# Patient Record
Sex: MALE | Race: WHITE
[De-identification: names, ages, dates, MRNs, and addresses within clinical notes are randomized per-mention and may not be internally consistent; named-entity substitution may affect disease eponyms.]

---

## 2019-07-24 ENCOUNTER — HOSPITAL ENCOUNTER (OUTPATIENT)
Dept: HOSPITAL 11 - JP.SDS | Age: 74
Discharge: HOME | End: 2019-07-24
Attending: SURGERY
Payer: MEDICARE

## 2019-07-24 VITALS — SYSTOLIC BLOOD PRESSURE: 129 MMHG | HEART RATE: 45 BPM | DIASTOLIC BLOOD PRESSURE: 88 MMHG

## 2019-07-24 DIAGNOSIS — E78.5: ICD-10-CM

## 2019-07-24 DIAGNOSIS — I12.9: ICD-10-CM

## 2019-07-24 DIAGNOSIS — Z12.11: Primary | ICD-10-CM

## 2019-07-24 DIAGNOSIS — N18.3: ICD-10-CM

## 2019-07-24 DIAGNOSIS — C61: ICD-10-CM

## 2019-07-24 DIAGNOSIS — Z86.010: ICD-10-CM

## 2019-07-24 NOTE — OR
DATE OF PROCEDURE:  07/24/2019

 

PREOPERATIVE DIAGNOSIS:  History of colon polyps.

 

POSTOPERATIVE DIAGNOSIS:  History of colon polyps.  Unremarkable colonoscopy.

 

PROCEDURE:  Colonoscopy to the cecum.



SURGEON:  Jose Olivas MD. 

 

ANESTHESIA:  IV anesthesia with monitored anesthesia care.

 

INDICATION:  This 74-year-old white male is referred for a colonoscopy.  He has 
a history of

colon polyps.  His last colonoscopic exam, he says, was done 5 years ago.  He 
also has

metastatic prostate cancer.  I counseled him for the procedure including risks 
and alternatives, 

and he gave his informed consent to proceed.

 

DESCRIPTION OF PROCEDURE:  The patient was placed in the left lateral decubitus 
position.

IV anesthesia was administered by the Anesthesia Service.  Time-out was held.  
A rectal exam

was performed, which was unrevealing.  The flexible video Olympus colonoscope 
was introduced

through his anus, up his rectum, and out his colon all the way to the cecum.  
Once the cecum

was reached, the scope was slowly withdrawn examining the mucosa throughout.  
No mucosal

abnormalities were noted.  The scope was retroflexed in the rectum with the 
distal rectum

appearing unremarkable.  The scope was straightened and removed.  He tolerated 
the procedure

well.

 

 

 

 

Jose Olivas MD

DD:  07/24/2019 10:46:19

DT:  07/24/2019 11:16:14

Job #:  [id number]

MTDD

## 2020-12-06 NOTE — EDM.PDOC
ED HPI GENERAL MEDICAL PROBLEM





- General


Chief Complaint: Respiratory Problem


Stated Complaint: POSS PNEUMONIA


Time Seen by Provider: 12/06/20 12:05


Source of Information: Reports: Patient, Old Records, RN


History Limitations: Reports: No Limitations





- History of Present Illness


INITIAL COMMENTS - FREE TEXT/NARRATIVE: 





74 yo male presents with coughing, mainly in the mornings for about a month. No 

fever. Has not been to his primary Dr. Estrada at any time over this interval. 

Decided today that perhaps he had pneumonia and came to the ER. Has some post 

nasal drip. No hx of asthma since childhood. Is not a smoker. Is currently on 

Amox for a dental infection. 


Onset: Gradual


Duration: Week(s): (4), Waxing/Waning


Location: Reports: Head, Chest


Quality: Reports: Other (no reported pain)


Severity: Mild


Improves with: Reports: None


Worsens with: Reports: Other (in mornings, ? cause)


Context: Reports: Other (See HPI)


Associated Symptoms: Reports: Cough


Treatments PTA: Reports: Other (see below) (none)





- Related Data


                                    Allergies











Allergy/AdvReac Type Severity Reaction Status Date / Time


 


No Known Allergies Allergy   Verified 12/06/20 11:50











Home Meds: 


                                    Home Meds





Lisinopril 30 mg PO DAILY 05/11/16 [History]


Amoxicillin 1 tab PO DAILY 12/06/20 [History]


Fenofibrate 1 tab PO DAILY 12/06/20 [History]











Past Medical History


HEENT History: Reports: Cataract, Hard of Hearing, Impaired Vision


Cardiovascular History: Reports: High Cholesterol, Hypertension


Genitourinary History: Reports: BPH, Other (See Below)


Other Genitourinary History: Prostate cancer/hormone injections


Hematologic History: Reports: Anemia


Oncologic (Cancer) History: Reports: Prostate





- Infectious Disease History


Infectious Disease History: Reports: Chicken Pox, Measles, Mumps, Shingles





- Past Surgical History


HEENT Surgical History: Reports: Cataract Surgery, Oral Surgery


GI Surgical History: Reports: Colonoscopy, EGD


Male  Surgical History: Reports: Prostate Biopsy





Social & Family History





- Tobacco Use


Tobacco Use Status *Q: Never Tobacco User





- Caffeine Use


Caffeine Use: Reports: Coffee





ED ROS GENERAL





- Review of Systems


Review Of Systems: See Below


Constitutional: Reports: No Symptoms


HEENT: Reports: Other (cerumen in both ears)


Respiratory: Reports: Cough.  Denies: Shortness of Breath, Wheezing


Cardiovascular: Reports: No Symptoms


GI/Abdominal: Reports: No Symptoms


Skin: Reports: No Symptoms





ED EXAM, GENERAL





- Physical Exam


Exam: See Below


Exam Limited By: Uncooperative


General Appearance: Alert, WD/WN, No Apparent Distress


Eye Exam: Bilateral Eye: Normal Inspection


Ears: Normal External Exam, Normal Canal, Hearing Loss, Other (cerumen in both 

ears)


Ear Exam: Bilateral Ear: Auricle Normal, Canal Normal


Nose: Normal Inspection, No Blood


Throat/Mouth: Normal Inspection, Normal Lips, Normal Oropharynx, Normal Voice, 

No Airway Compromise


Head: Atraumatic, Normocephalic


Neck: Normal Inspection


Respiratory/Chest: No Respiratory Distress, Lungs Clear, Normal Breath Sounds, 

No Accessory Muscle Use


Cardiovascular: Regular Rate, Rhythm, No Edema


Extremities: Normal Inspection


Neurological: Alert, Oriented, CN II-XII Intact, Normal Cognition, No 

Motor/Sensory Deficits


Psychiatric: Normal Affect, Normal Mood


Skin Exam: Warm, Dry, Intact, Normal Color, No Rash





Course





- Vital Signs


Last Recorded V/S: 





                                Last Vital Signs











Temp  36.1 C   12/06/20 12:02


 


Pulse  75   12/06/20 12:02


 


Resp  18   12/06/20 12:02


 


BP  124/35 L  12/06/20 12:02


 


Pulse Ox  97   12/06/20 12:02














- Orders/Labs/Meds


Labs: 





                                Laboratory Tests











  12/06/20 Range/Units





  11:48 


 


WBC  7.9  (4.5-11.0)  K/uL


 


RBC  4.10 L  (4.30-5.90)  M/uL


 


Hgb  11.0 L  (12.0-15.0)  g/dL


 


Hct  34.6 L  (40.0-54.0)  %


 


MCV  84  (80-98)  fL


 


MCH  27  (27-31)  pg


 


MCHC  32  (32-36)  %


 


Plt Count  229  (150-400)  K/uL














Departure





- Departure


Time of Disposition: 12:35


Disposition: Home, Self-Care 01


Condition: Good


Clinical Impression: 


 Post-nasal drip





Cerumen impaction


Qualifiers:


 Laterality: bilateral Qualified Code(s): H61.23 - Impacted cerumen, bilateral








- Discharge Information


*PRESCRIPTION DRUG MONITORING PROGRAM REVIEWED*: Not Applicable


*COPY OF PRESCRIPTION DRUG MONITORING REPORT IN PATIENT GORDON: Not Applicable


Referrals: 


Raffaele Estrada MD [Primary Care Provider] - 


Additional Instructions: 


Try cetirizine 10 mg daily and sinus rinse(comes in a package with a bottle and 

electrolytes) daily in the morning. Finish your amoxicillin. F/U with Dr. Estrada. 





Sepsis Event Note (ED)





- Evaluation


Sepsis Screening Result: No Definite Risk





- Focused Exam


Vital Signs: 





                                   Vital Signs











  Temp Pulse Resp BP Pulse Ox


 


 12/06/20 12:02  36.1 C  75  18  124/35 L  97


 


 12/06/20 11:38  36.1 C  75  18  124/35 L  97